# Patient Record
(demographics unavailable — no encounter records)

---

## 2024-11-29 NOTE — HISTORY OF PRESENT ILLNESS
[FreeTextEntry1] : acne [de-identified] : acne on face, chest, back spot check  attending [facility] for [facility]

## 2024-11-29 NOTE — PHYSICAL EXAM
[FreeTextEntry3] : scattered brown nevi on chest, chin, back  erythematous papules, nodules on chin, comedones on forehead  cysts and nodules on upper back

## 2024-11-29 NOTE — ASSESSMENT
[FreeTextEntry1] : #acne chronic flaring --start doxycycline 100 mg BID for 3 months. RTC 3 months -start 4% BP wash in shower -start tretinoin 0.025% cream.Will trial topical agent qhs. Potential side effect of topical agent includes but not limited to erythema and dryness of skin. If experienced recommend spacing out use to every 2-3 nights. Recommend daily noncomedogenic moisturizer and face wash. If no improvement return to office.  #multiple nevi --reassured benign on focal exam on face and chest --RTC for [facility] in March

## 2025-03-19 NOTE — HISTORY OF PRESENT ILLNESS
[FreeTextEntry1] : acne [de-identified] : f/u acne s/p 3 months doxycycline applying tretinoin 0.025% cream at bedtime, reports minimal dryness   attending [facility]

## 2025-03-19 NOTE — ASSESSMENT
[FreeTextEntry1] : #acne chronic flaring --s/p doxycycline 100 mg BID for 3 months -start 4% BP wash in shower -start tretinoin 0.05% cream.Will trial topical agent qhs. Potential side effect of topical agent includes but not limited to erythema and dryness of skin. If experienced recommend spacing out use to every 2-3 nights. Recommend daily noncomedogenic moisturizer and face wash. If no improvement return to office.  #multiple nevi; waist up check only --reassured benign on focal exam on face and chest   RTC 3-4 months

## 2025-04-17 NOTE — PHYSICAL EXAM

## 2025-04-17 NOTE — DISCUSSION/SUMMARY
[Normal Growth] : growth [Normal Development] : development  [No Elimination Concerns] : elimination [Continue Regimen] : feeding [No Skin Concerns] : skin [Normal Sleep Pattern] : sleep [None] : no medical problems [Anticipatory Guidance Given] : Anticipatory guidance addressed as per the history of present illness section [Physical Growth and Development] : physical growth and development [Social and Academic Competence] : social and academic competence [Emotional Well-Being] : emotional well-being [Risk Reduction] : risk reduction [Violence and Injury Prevention] : violence and injury prevention [No Vaccines] : no vaccines needed [No Medications] : ~He/She~ is not on any medications [Patient] : patient [Parent/Guardian] : Parent/Guardian [] : The components of the vaccine(s) to be administered today are listed in the plan of care. The disease(s) for which the vaccine(s) are intended to prevent and the risks have been discussed with the caretaker.  The risks are also included in the appropriate vaccination information statements which have been provided to the patient's caregiver.  The caregiver has given consent to vaccinate. [FreeTextEntry1] : Continue balanced diet with all food groups. Brush teeth twice a day with toothbrush. Recommend visit to dentist. Maintain consistent daily routines and sleep schedule. Personal hygiene, puberty, and sexual health reviewed. Risky behaviors assessed. School discussed. Limit screen time to no more than 2 hours per day. Encourage physical activity. Return 1 year for routine well child check.  sent for labs [facility] FORTE given

## 2025-04-17 NOTE — HISTORY OF PRESENT ILLNESS
[Mother] : mother [Yes] : Patient goes to dentist yearly [Up to date] : Up to date [Normal] : normal [LMP: _____] : LMP: [unfilled] [Eats meals with family] : eats meals with family [Has family members/adults to turn to for help] : has family members/adults to turn to for help [Is permitted and is able to make independent decisions] : Is permitted and is able to make independent decisions [Sleep Concerns] : no sleep concerns [Grade: ____] : Grade: [unfilled] [Normal Performance] : normal performance [Eats regular meals including adequate fruits and vegetables] : eats regular meals including adequate fruits and vegetables [Drinks non-sweetened liquids] : drinks non-sweetened liquids  [Calcium source] : calcium source [Has concerns about body or appearance] : does not have concerns about body or appearance [Has friends] : has friends [At least 1 hour of physical activity a day] : at least 1 hour of physical activity a day [Screen time (except homework) less than 2 hours a day] : no screen time (except homework) less than 2 hours a day [Has interests/participates in community activities/volunteers] : has interests/participates in community activities/volunteers. [Uses electronic nicotine delivery system] : does not use electronic nicotine delivery system [Exposure to electronic nicotine delivery system] : no exposure to electronic nicotine delivery system [Uses tobacco] : does not use tobacco [Exposure to tobacco] : no exposure to tobacco [Uses drugs] : does not use drugs  [Exposure to drugs] : no exposure to drugs [Drinks alcohol] : drinks alcohol [Exposure to alcohol] : exposure to alcohol [Uses safety belts/safety equipment] : uses safety belts/safety equipment  [Impaired/distracted driving] : no impaired/distracted driving [Has peer relationships free of violence] : has peer relationships free of violence [No] : Patient has not had sexual intercourse. [Has ways to cope with stress] : has ways to cope with stress [Displays self-confidence] : displays self-confidence [Has problems with sleep] : does not have problems with sleep [Gets depressed, anxious, or irritable/has mood swings] : does not get depressed, anxious, or irritable/has mood swings [Has thought about hurting self or considered suicide] : has not thought about hurting self or considered suicide [With Teen] : teen [NO] : No

## 2025-04-17 NOTE — RISK ASSESSMENT

## 2025-06-10 NOTE — DISCUSSION/SUMMARY
[FreeTextEntry1] : 18 year old w/ worsening cough for past month. well appearing w/ normal vitals. chest pain likely costochondritis and discussed NSAIDs PRN -send for cxr to r/o pneumonia -trial course of tessalon pearles - Continue supportive care, including nasal suction, use of humidifiers, and elevating head w/ extra pillow for postnasal drip.  - If new or worsening symptoms or parental concern - return to office or ED.

## 2025-06-10 NOTE — HISTORY OF PRESENT ILLNESS
[de-identified] : lingering cough, chest hurts if persistent cough [FreeTextEntry6] : worsening cough for the past month. no fevers. thought allergies at first so taking otc allergy medicine w/ no improvement, now worsening w/ chest pain when coughing. no diff breathing or other concerns